# Patient Record
Sex: MALE | Race: WHITE | NOT HISPANIC OR LATINO | Employment: STUDENT | URBAN - METROPOLITAN AREA
[De-identification: names, ages, dates, MRNs, and addresses within clinical notes are randomized per-mention and may not be internally consistent; named-entity substitution may affect disease eponyms.]

---
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
## 2023-03-28 ENCOUNTER — HOSPITAL ENCOUNTER (EMERGENCY)
Facility: HOSPITAL | Age: 18
Discharge: HOME | End: 2023-03-28
Payer: COMMERCIAL

## 2023-03-28 VITALS
HEIGHT: 72 IN | OXYGEN SATURATION: 99 % | HEART RATE: 80 BPM | SYSTOLIC BLOOD PRESSURE: 100 MMHG | DIASTOLIC BLOOD PRESSURE: 75 MMHG | WEIGHT: 150 LBS | BODY MASS INDEX: 20.32 KG/M2 | TEMPERATURE: 98 F | RESPIRATION RATE: 16 BRPM

## 2023-03-28 DIAGNOSIS — S39.012A STRAIN OF LUMBAR REGION, INITIAL ENCOUNTER: ICD-10-CM

## 2023-03-28 DIAGNOSIS — R52 PAIN: ICD-10-CM
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
## 2023-03-28 DIAGNOSIS — S93.402A SPRAIN OF LEFT ANKLE, UNSPECIFIED LIGAMENT, INITIAL ENCOUNTER: ICD-10-CM

## 2023-03-28 PROCEDURE — 73610 X-RAY EXAM OF ANKLE: CPT | Mod: TC,LT

## 2023-03-28 PROCEDURE — 72100 X-RAY EXAM L-S SPINE 2/3 VWS: CPT | Mod: TC

## 2023-03-28 PROCEDURE — 73610 XR ANKLE COMPLETE 3 VIEW LEFT: ICD-10-PCS | Mod: 26,LT,, | Performed by: RADIOLOGY

## 2023-03-28 PROCEDURE — 25000003 PHARM REV CODE 250: Performed by: NURSE PRACTITIONER

## 2023-03-28 PROCEDURE — 72040 X-RAY EXAM NECK SPINE 2-3 VW: CPT | Mod: 26,,, | Performed by: RADIOLOGY

## 2023-03-28 PROCEDURE — 73610 X-RAY EXAM OF ANKLE: CPT | Mod: 26,LT,, | Performed by: RADIOLOGY

## 2023-03-28 PROCEDURE — 72040 X-RAY EXAM NECK SPINE 2-3 VW: CPT | Mod: TC

## 2023-03-28 PROCEDURE — 86803 HEPATITIS C AB TEST: CPT | Performed by: EMERGENCY MEDICINE

## 2023-03-28 PROCEDURE — 72100 XR LUMBAR SPINE AP AND LATERAL: ICD-10-PCS | Mod: 26,,, | Performed by: RADIOLOGY

## 2023-03-28 PROCEDURE — 99284 EMERGENCY DEPT VISIT MOD MDM: CPT

## 2023-03-28 PROCEDURE — 72040 XR CERVICAL SPINE AP LATERAL: ICD-10-PCS | Mod: 26,,, | Performed by: RADIOLOGY

## 2023-03-28 PROCEDURE — 87389 HIV-1 AG W/HIV-1&-2 AB AG IA: CPT | Performed by: EMERGENCY MEDICINE

## 2023-03-28 PROCEDURE — 72100 X-RAY EXAM L-S SPINE 2/3 VWS: CPT | Mod: 26,,, | Performed by: RADIOLOGY

## 2023-03-28 RX ORDER — IBUPROFEN 400 MG/1
800 TABLET ORAL
Status: COMPLETED | OUTPATIENT
Start: 2023-03-28 | End: 2023-03-28

## 2023-03-28 RX ORDER — HYDROCODONE BITARTRATE AND ACETAMINOPHEN 7.5; 325 MG/1; MG/1
1 TABLET ORAL EVERY 6 HOURS PRN
COMMUNITY
Start: 2022-12-02

## 2023-03-28 RX ADMIN — IBUPROFEN 800 MG: 400 TABLET, FILM COATED ORAL at 07:03
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
## 2023-03-28 NOTE — Clinical Note
"Ty "Ty"Alton was seen and treated in our emergency department on 3/28/2023.  He may return to work on 03/29/2023.       If you have any questions or concerns, please don't hesitate to call.      Naif Cuello NP"
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
## 2023-03-29 LAB
HCV AB SERPL QL IA: NORMAL
HIV 1+2 AB+HIV1 P24 AG SERPL QL IA: NORMAL

## 2023-03-29 NOTE — DISCHARGE INSTRUCTIONS
Take 600-800 mg ibuprofen 3 times a day as needed for pain.    May alternate with Tylenol if needed.    Follow-up primary care provider in 3-5 days if continued have pain.    Return emergency room if you develop any new symptoms, or any other worrisome symptoms.

## 2024-06-18 ENCOUNTER — HOSPITAL ENCOUNTER (EMERGENCY)
Facility: HOSPITAL | Age: 19
Discharge: HOME | End: 2024-06-19
Attending: EMERGENCY MEDICINE

## 2024-06-18 DIAGNOSIS — S01.511A LACERATION OF VERMILION BORDER OF UPPER LIP, INITIAL ENCOUNTER: Primary | ICD-10-CM

## 2024-06-18 PROCEDURE — 99283 EMERGENCY DEPT VISIT LOW MDM: CPT

## 2024-06-19 VITALS
RESPIRATION RATE: 16 BRPM | WEIGHT: 150 LBS | OXYGEN SATURATION: 100 % | HEART RATE: 75 BPM | HEIGHT: 71 IN | TEMPERATURE: 98 F | BODY MASS INDEX: 21 KG/M2 | DIASTOLIC BLOOD PRESSURE: 77 MMHG | SYSTOLIC BLOOD PRESSURE: 133 MMHG

## 2024-06-19 PROCEDURE — 25000003 PHARM REV CODE 250: Performed by: EMERGENCY MEDICINE

## 2024-06-19 PROCEDURE — 12011 RPR F/E/E/N/L/M 2.5 CM/<: CPT

## 2024-06-19 RX ORDER — LIDOCAINE HYDROCHLORIDE 10 MG/ML
5 INJECTION, SOLUTION EPIDURAL; INFILTRATION; INTRACAUDAL; PERINEURAL
Status: COMPLETED | OUTPATIENT
Start: 2024-06-19 | End: 2024-06-19

## 2024-06-19 RX ORDER — CEPHALEXIN 250 MG/1
250 CAPSULE ORAL EVERY 8 HOURS
Qty: 15 CAPSULE | Refills: 0 | Status: SHIPPED | OUTPATIENT
Start: 2024-06-19 | End: 2024-06-24

## 2024-06-19 RX ORDER — CEPHALEXIN 250 MG/1
500 CAPSULE ORAL
Status: COMPLETED | OUTPATIENT
Start: 2024-06-19 | End: 2024-06-19

## 2024-06-19 RX ADMIN — BACITRACIN ZINC, NEOMYCIN, POLYMYXIN B 1 EACH: 400; 3.5; 5 OINTMENT TOPICAL at 02:06

## 2024-06-19 RX ADMIN — LIDOCAINE HYDROCHLORIDE 50 MG: 10 INJECTION, SOLUTION EPIDURAL; INFILTRATION; INTRACAUDAL; PERINEURAL at 12:06

## 2024-06-19 RX ADMIN — CEPHALEXIN 500 MG: 250 CAPSULE ORAL at 02:06

## 2024-06-19 NOTE — DISCHARGE INSTRUCTIONS
As we discussed, keep the wound clean and dry.  Apply antibiotic ointment on the wound 2 or 3 times daily for the 1st 3 days, then use plain Vaseline.  Take Keflex as prescribed to help prevent infection.  Your sutures will dissolve after about one-week.  This may be a slow process, and some of the sutures may take longer than others to dissolve.  Try not to mess with them.  Return here if you seeany signs of infection

## 2024-06-19 NOTE — ED PROVIDER NOTES
Encounter Date: 6/18/2024       History     Chief Complaint   Patient presents with    Lip Laceration     Pt reports he fell off a rip stick and caused a laceration to top lip. Pt denies LOC.      18-year-old male, here from home via private vehicle for evaluation and treatment of a lip laceration.  Patient was riding a skateboard type device he fell, and struck his face on the ground.  No LOC.  States his teeth are fine.  Last tetanus was about 2 weeks ago.  Denies any other injuries.      Review of patient's allergies indicates:  No Known Allergies  Past Medical History:   Diagnosis Date    Eczema      Past Surgical History:   Procedure Laterality Date    CLAVICLE SURGERY  12/2017    Pt has plates and screws    SHOULDER SURGERY Right      Family History   Problem Relation Name Age of Onset    Hypertension Mother      Brain cancer Mother      Heart disease Paternal Uncle      Stomach cancer Paternal Uncle      Kidney cancer Maternal Grandfather      Kidney cancer Maternal Uncle       Social History     Tobacco Use    Smoking status: Every Day     Types: Cigarettes, Vaping with nicotine    Smokeless tobacco: Never   Substance Use Topics    Alcohol use: Yes     Comment: occasionally. last drink was 6 months ago    Drug use: Yes     Types: Marijuana     Comment: rarely     Review of Systems   Constitutional: Negative.    HENT: Negative.     Eyes: Negative.    Respiratory: Negative.     Cardiovascular: Negative.    Gastrointestinal: Negative.    Endocrine: Negative.    Genitourinary: Negative.    Musculoskeletal: Negative.    Skin:  Positive for wound.   Neurological: Negative.    Psychiatric/Behavioral: Negative.         Physical Exam     Initial Vitals [06/18/24 2303]   BP Pulse Resp Temp SpO2   (!) 144/83 110 17 98.1 °F (36.7 °C) 95 %      MAP       --         Physical Exam    Nursing note and vitals reviewed.  Constitutional: He appears well-developed and well-nourished. He is not diaphoretic. No distress.   HENT:    Head: Normocephalic.   Nose: Nose normal.   Mouth/Throat: Oropharynx is clear and moist. No oropharyngeal exudate.   Right upper lip laceration, through the vermilion border, teeth intact.   Eyes: Conjunctivae and EOM are normal. Pupils are equal, round, and reactive to light. No scleral icterus.   Neck: Neck supple. No JVD present.   Normal range of motion.  Cardiovascular:  Normal rate, regular rhythm, normal heart sounds and intact distal pulses.           No murmur heard.  Pulmonary/Chest: Breath sounds normal. No stridor. No respiratory distress. He has no wheezes. He has no rhonchi. He has no rales.   Abdominal: Abdomen is soft. Bowel sounds are normal. He exhibits no distension. There is no abdominal tenderness.   Musculoskeletal:         General: No tenderness or edema. Normal range of motion.      Cervical back: Normal range of motion and neck supple.     Neurological: He is alert and oriented to person, place, and time. He has normal strength. No cranial nerve deficit or sensory deficit. GCS score is 15. GCS eye subscore is 4. GCS verbal subscore is 5. GCS motor subscore is 6.   Skin: Skin is warm and dry. Capillary refill takes less than 2 seconds. No rash noted. No erythema.   There is a vertically oriented laceration of the upper lip, left side, it does cross the vermilion border.  Not a through and through laceration.  Bleeding is well controlled.  Laceration length proximally 1.5 cm.  Bleeding well controlled.   Psychiatric: He has a normal mood and affect. His behavior is normal.         ED Course   Lac Repair    Date/Time: 6/19/2024 2:15 AM    Performed by: Dinh Gonzales MD  Authorized by: Dinh Gonzales MD    Consent:     Consent obtained:  Verbal    Consent given by:  Patient    Risks, benefits, and alternatives were discussed: yes      Risks discussed:  Infection, need for additional repair, nerve damage, poor wound healing, poor cosmetic result, pain, retained foreign body, tendon damage  and vascular damage    Alternatives discussed:  No treatment and referral  Universal protocol:     Procedure explained and questions answered to patient or proxy's satisfaction: yes      Relevant documents present and verified: yes      Test results available: yes      Imaging studies available: no      Required blood products, implants, devices, and special equipment available: yes      Site/side marked: yes      Immediately prior to procedure, a time out was called: yes      Patient identity confirmed:  Verbally with patient and arm band  Anesthesia:     Anesthesia method:  Local infiltration    Local anesthetic:  Lidocaine 1% w/o epi  Laceration details:     Location:  Lip    Lip location:  Upper exterior lip    Length (cm):  1.5    Depth (mm):  5  Pre-procedure details:     Preparation:  Patient was prepped and draped in usual sterile fashion  Exploration:     Limited defect created (wound extended): no      Hemostasis achieved with:  Direct pressure    Wound exploration: wound explored through full range of motion and entire depth of wound visualized      Wound extent: no areolar tissue violation noted, no fascia violation noted, no foreign bodies/material noted, no muscle damage noted, no nerve damage noted, no tendon damage noted, no underlying fracture noted and no vascular damage noted      Contaminated: no    Treatment:     Area cleansed with:  Povidone-iodine and saline    Amount of cleaning:  Standard    Irrigation solution:  Sterile saline    Irrigation volume:  20mL    Irrigation method:  Syringe    Debridement:  None    Undermining:  None    Scar revision: no    Skin repair:     Repair method:  Sutures    Suture size:  5-0    Suture material:  Fast-absorbing gut    Suture technique:  Simple interrupted    Number of sutures:  5  Approximation:     Approximation:  Close    Vermilion border well-aligned: yes    Repair type:     Repair type:  Intermediate  Post-procedure details:     Dressing:  Antibiotic  ointment    Procedure completion:  Tolerated well, no immediate complications    Labs Reviewed - No data to display       Imaging Results    None          Medications   cephALEXin capsule 500 mg (has no administration in time range)   neomycin-bacitracnZn-polymyxnB packet (has no administration in time range)   LIDOcaine (PF) 10 mg/ml (1%) injection 50 mg (50 mg Infiltration Given by Provider 6/19/24 0034)     Medical Decision Making  Differential includes lip laceration, vermilion border laceration, tooth injury, your injury, facial fractures, facial contusions, etc.    Laceration through the vermilion border of the right upper lip.  Not a full-thickness laceration.  Patient started on Keflex here, wound thoroughly cleansed, and sutured as described above.  Sterile dressing placed.  Patient tolerated the procedure well.  He will return here for any worsening signs or symptoms.  He will take Keflex as prescribed Tylenol and Motrin for discomfort.    Risk  OTC drugs.  Prescription drug management.                                      Clinical Impression:  Final diagnoses:  [S01.511A] Laceration of vermilion border of upper lip, initial encounter (Primary)          ED Disposition Condition    Discharge Stable          ED Prescriptions       Medication Sig Dispense Start Date End Date Auth. Provider    cephALEXin (KEFLEX) 250 MG capsule Take 1 capsule (250 mg total) by mouth every 8 (eight) hours. for 5 days 15 capsule 6/19/2024 6/24/2024 Dinh Gonzales MD          Follow-up Information       Follow up With Specialties Details Why Contact [facility] Emergency Dept Emergency Medicine  As needed, If symptoms worsen [address] [facility] [address] [phone]             Dinh Gonzales MD  06/19/24 1968

## 2024-12-26 ENCOUNTER — HOSPITAL ENCOUNTER (EMERGENCY)
Facility: HOSPITAL | Age: 19
Discharge: HOME | End: 2024-12-26

## 2024-12-26 VITALS
BODY MASS INDEX: 23.66 KG/M2 | DIASTOLIC BLOOD PRESSURE: 63 MMHG | HEIGHT: 71 IN | TEMPERATURE: 102 F | OXYGEN SATURATION: 96 % | SYSTOLIC BLOOD PRESSURE: 119 MMHG | RESPIRATION RATE: 18 BRPM | HEART RATE: 124 BPM | WEIGHT: 169 LBS

## 2024-12-26 DIAGNOSIS — J10.1 INFLUENZA A: Primary | ICD-10-CM

## 2024-12-26 DIAGNOSIS — R05.9 COUGH: ICD-10-CM

## 2024-12-26 LAB
INFLUENZA A, MOLECULAR: POSITIVE
INFLUENZA B, MOLECULAR: NEGATIVE
SARS-COV-2 RDRP RESP QL NAA+PROBE: NEGATIVE
SPECIMEN SOURCE: ABNORMAL

## 2024-12-26 PROCEDURE — 71046 X-RAY EXAM CHEST 2 VIEWS: CPT | Mod: 26,,, | Performed by: RADIOLOGY

## 2024-12-26 PROCEDURE — 99284 EMERGENCY DEPT VISIT MOD MDM: CPT | Mod: 25

## 2024-12-26 PROCEDURE — 25000003 PHARM REV CODE 250: Performed by: NURSE PRACTITIONER

## 2024-12-26 PROCEDURE — 94761 N-INVAS EAR/PLS OXIMETRY MLT: CPT

## 2024-12-26 PROCEDURE — 71046 X-RAY EXAM CHEST 2 VIEWS: CPT | Mod: TC

## 2024-12-26 PROCEDURE — 87635 SARS-COV-2 COVID-19 AMP PRB: CPT | Performed by: NURSE PRACTITIONER

## 2024-12-26 PROCEDURE — 87502 INFLUENZA DNA AMP PROBE: CPT | Performed by: NURSE PRACTITIONER

## 2024-12-26 PROCEDURE — 94640 AIRWAY INHALATION TREATMENT: CPT

## 2024-12-26 PROCEDURE — 25000242 PHARM REV CODE 250 ALT 637 W/ HCPCS: Performed by: NURSE PRACTITIONER

## 2024-12-26 RX ORDER — METHYLPREDNISOLONE 4 MG/1
TABLET ORAL
Qty: 21 EACH | Refills: 0 | Status: SHIPPED | OUTPATIENT
Start: 2024-12-26 | End: 2025-01-16

## 2024-12-26 RX ORDER — ALBUTEROL SULFATE 90 UG/1
1-2 INHALANT RESPIRATORY (INHALATION) EVERY 6 HOURS PRN
Qty: 8 G | Refills: 0 | Status: SHIPPED | OUTPATIENT
Start: 2024-12-26 | End: 2025-01-25

## 2024-12-26 RX ORDER — ALBUTEROL SULFATE 0.83 MG/ML
2.5 SOLUTION RESPIRATORY (INHALATION)
Status: COMPLETED | OUTPATIENT
Start: 2024-12-26 | End: 2024-12-26

## 2024-12-26 RX ORDER — OSELTAMIVIR PHOSPHATE 75 MG/1
75 CAPSULE ORAL 2 TIMES DAILY
Qty: 10 CAPSULE | Refills: 0 | Status: SHIPPED | OUTPATIENT
Start: 2024-12-26 | End: 2024-12-31

## 2024-12-26 RX ORDER — ACETAMINOPHEN 500 MG
1000 TABLET ORAL
Status: COMPLETED | OUTPATIENT
Start: 2024-12-26 | End: 2024-12-26

## 2024-12-26 RX ADMIN — ALBUTEROL SULFATE 2.5 MG: 2.5 SOLUTION RESPIRATORY (INHALATION) at 09:12

## 2024-12-26 RX ADMIN — ACETAMINOPHEN 1000 MG: 500 TABLET ORAL at 08:12

## 2024-12-27 NOTE — ED PROVIDER NOTES
Encounter Date: 12/26/2024       History     Chief Complaint   Patient presents with    General Illness     Cough, congestion, and body aches x 2 days. Friend flu+     18 yo male with c/o cough, congestion and fever for the past 2 days. He reports exposure to the flu. He does have hx of asthma. He states he does not have an inhaler currently.         Review of patient's allergies indicates:  No Known Allergies  Past Medical History:   Diagnosis Date    Asthma     Eczema      Past Surgical History:   Procedure Laterality Date    CLAVICLE SURGERY  12/2017    Pt has plates and screws    SHOULDER SURGERY Right      Family History   Problem Relation Name Age of Onset    Hypertension Mother      Brain cancer Mother      Heart disease Paternal Uncle      Stomach cancer Paternal Uncle      Kidney cancer Maternal Grandfather      Kidney cancer Maternal Uncle       Social History     Tobacco Use    Smoking status: Every Day     Types: Cigarettes, Vaping with nicotine    Smokeless tobacco: Never   Substance Use Topics    Alcohol use: Yes     Comment: occasionally. last drink was 6 months ago    Drug use: Yes     Types: Marijuana     Comment: rarely     Review of Systems   Constitutional:  Positive for fever.   HENT:  Positive for rhinorrhea. Negative for sore throat.    Respiratory:  Positive for cough. Negative for shortness of breath.    Cardiovascular:  Negative for chest pain.   Gastrointestinal:  Negative for nausea.   Genitourinary:  Negative for dysuria.   Musculoskeletal:  Negative for back pain.   Skin:  Negative for rash.   Neurological:  Negative for weakness.   Hematological:  Does not bruise/bleed easily.   All other systems reviewed and are negative.      Physical Exam     Initial Vitals [12/26/24 1943]   BP Pulse Resp Temp SpO2   119/63 (!) 120 20 (!) 101.7 °F (38.7 °C) (!) 93 %      MAP       --         Physical Exam    Nursing note and vitals reviewed.  Constitutional: He appears well-developed and  well-nourished.   HENT:   Head: Normocephalic and atraumatic.   Eyes: Conjunctivae and EOM are normal.   Neck: Neck supple.   Normal range of motion.  Cardiovascular:  Normal rate and regular rhythm.           Pulmonary/Chest: He has wheezes.   Musculoskeletal:         General: No tenderness. Normal range of motion.      Cervical back: Normal range of motion and neck supple.     Neurological: He is alert and oriented to person, place, and time.   Skin: Skin is warm and dry.   Psychiatric: He has a normal mood and affect.         ED Course   Procedures  Labs Reviewed   INFLUENZA A & B BY MOLECULAR - Abnormal       Result Value    Influenza A, Molecular Positive (*)     Influenza B, Molecular Negative      Flu A & B Source Nasal Swab     SARS-COV-2 RNA AMPLIFICATION, QUAL    SARS-CoV-2 RNA, Amplification, Qual Negative            Imaging Results              X-Ray Chest PA And Lateral (Final result)  Result time 12/26/24 20:52:41      Final result by Sai Parson MD (12/26/24 20:52:41)                   Impression:      No radiographic evidence of acute intrathoracic process.      Electronically signed by: Sai Parson MD  Date:    12/26/2024  Time:    20:52               Narrative:    EXAMINATION:  XR CHEST PA AND LATERAL    CLINICAL HISTORY:  Cough, unspecified    TECHNIQUE:  PA and lateral views of the chest were performed.    COMPARISON:  None    FINDINGS:  The cardiomediastinal silhouette appears within normal limits.  The lungs are symmetrically aerated without evidence of focal airspace consolidation.  There is no pleural effusion or pneumothorax.  There is postoperative change of prior ORIF of the right clavicle.                                       Medications   acetaminophen tablet 1,000 mg (1,000 mg Oral Given 12/26/24 2052)   albuterol nebulizer solution 2.5 mg (2.5 mg Nebulization Given 12/26/24 2120)     Medical Decision Making  18 yo male with c/o cough, congestion and fever for the past 2  days. He reports exposure to the flu.     COVID is negative. Influenza A is positive. CXR shows no PNA. Patient wheezing on PE. Given Albuterol neb here. Rx for Albuterol inhaler, Medrol dose pack and Tamiflu. Instructed to take OTC meds as needed. Instructed to FU with PCP and ER for any acute worsening of symptoms    Patient not wheezing after reevaluation from breathing tx      Amount and/or Complexity of Data Reviewed  Radiology: ordered.    Risk  OTC drugs.  Prescription drug management.      Additional MDM:   Differential Diagnosis:   Influenza, COVID, pneumonia, asthma exacerbation                                    Clinical Impression:  Final diagnoses:  [R05.9] Cough  [J10.1] Influenza A (Primary)          ED Disposition Condition    Discharge Stable          ED Prescriptions       Medication Sig Dispense Start Date End Date Auth. Provider    oseltamivir (TAMIFLU) 75 MG capsule Take 1 capsule (75 mg total) by mouth 2 (two) times daily. for 5 days 10 capsule 12/26/2024 12/31/2024 Nicolette Luo NP    methylPREDNISolone (MEDROL DOSEPACK) 4 mg tablet use as directed 21 each 12/26/2024 1/16/2025 Nicolette Luo NP    albuterol (PROVENTIL/VENTOLIN HFA) 90 mcg/actuation inhaler Inhale 1-2 puffs into the lungs every 6 (six) hours as needed for Wheezing. Rescue 8 g 12/26/2024 1/25/2025 Nicolette Luo NP          Follow-up Information    None          Nicolette Luo NP  12/26/24 2129       Nicolette Luo NP  12/26/24 2130

## 2024-12-27 NOTE — ED PROVIDER NOTES
Encounter Date: 12/26/2024       History     Chief Complaint   Patient presents with    General Illness     Cough, congestion, and body aches x 2 days. Friend flu+     20 yo male with c/o fever, cough, congestion and body aches that started 2 days ago. He reports exposure to the flu.         Review of patient's allergies indicates:  No Known Allergies  Past Medical History:   Diagnosis Date    Asthma     Eczema      Past Surgical History:   Procedure Laterality Date    CLAVICLE SURGERY  12/2017    Pt has plates and screws    SHOULDER SURGERY Right      Family History   Problem Relation Name Age of Onset    Hypertension Mother      Brain cancer Mother      Heart disease Paternal Uncle      Stomach cancer Paternal Uncle      Kidney cancer Maternal Grandfather      Kidney cancer Maternal Uncle       Social History     Tobacco Use    Smoking status: Every Day     Types: Cigarettes, Vaping with nicotine    Smokeless tobacco: Never   Substance Use Topics    Alcohol use: Yes     Comment: occasionally. last drink was 6 months ago    Drug use: Yes     Types: Marijuana     Comment: rarely     Review of Systems    Physical Exam     Initial Vitals [12/26/24 1943]   BP Pulse Resp Temp SpO2   119/63 (!) 120 20 (!) 101.7 °F (38.7 °C) (!) 93 %      MAP       --         Physical Exam    ED Course   Procedures  Labs Reviewed   INFLUENZA A & B BY MOLECULAR   SARS-COV-2 RNA AMPLIFICATION, QUAL          Imaging Results              X-Ray Chest PA And Lateral (In process)                      Medications   acetaminophen tablet 1,000 mg (has no administration in time range)     Medical Decision Making  Amount and/or Complexity of Data Reviewed  Radiology: ordered.    Risk  OTC drugs.                                      Clinical Impression:  Final diagnoses:  [R05.9] Cough